# Patient Record
(demographics unavailable — no encounter records)

---

## 2025-04-15 NOTE — CONSULT LETTER
[FreeTextEntry2] : Guerrero Martinez MD [FreeTextEntry1] : Dear Guerrero,  Thanks for referring Mr. Tilley for evaluation of his left tympanic membrane perforation and recurring otorrhea.  We discussed the option of surgical repair, but I first plan to check a temporal bone CT to assess for mastoid inflammation that would affect surgical planning.  Thanks again for your referral.  Sincerely,  Marc Lynn MD Otology/Neurotology [facility]

## 2025-04-15 NOTE — ASSESSMENT
[FreeTextEntry1] : Exam shows large left tympanic membrane perforation with erosion of malleus handle.  Right tympanic membrane intact without effusion or retraction, tympanosclerosis posteriorly.  I reviewed an audiogram which shows a left conductive hearing loss.  I reviewed other outside records including outside ENT office notes recommending evaluation for repair of eardrum perforation.  Check temporal bone CT given history of otorrhea, follow-up after imaging for further discussion regarding surgical repair perforation.  Patient has history of allergies and chronic nasal congestion as well as headaches, nasal endoscopy shows no evidence of acute sinusitis.

## 2025-04-15 NOTE — REASON FOR VISIT
[Initial Evaluation] : an initial evaluation for [[employer] ] : provided by [employer]   [Source: ______] : History obtained from [unfilled] [FreeTextEntry2] : L TM perf and CHL [Interpreters_IDNumber] : [id number] [Interpreters_FullName] : Ericka  [TWNoteComboBox1] : Christina

## 2025-04-15 NOTE — HISTORY OF PRESENT ILLNESS
[de-identified] : 44 year old male presents with L TM perf and CHL He is referred by Dr. Guerrero Martinez (ENT&A)- audiogram preformed 01/06/25  States hearing is poor.  Also experiencing water sensation to bilateral ears and itchy ears.  Not using hearing devices  Hx of migraines  Patient denies otalgia, otorrhea, ear infections, bleeding, hearing loss, ear surgeries, tinnitus, dizziness, vertigo, headaches related to hearing.

## 2025-04-15 NOTE — PROCEDURE
[FreeTextEntry3] : Procedure note:  Binocular microscopy  Apr 15, 2025   Inspection of the ears was performed under binocular microscopy because of need to accurately visualize otologic landmarks and potential pathologic conditions that would not otherwise be visible through simple otoscopic exam. [de-identified] : Procedure note: Nasal endoscopy   Apr 15, 2025   Description of Procedure:  Nasal endoscopy was performed because of recalcitrant symptoms of nasal obstruction and/or chronic rhinitis, and anterior anatomic abnormalities precluding visualization.  Using a flexible endoscope with sheath, the nasal mucosa, septum, turbinates, and ostiomeatal complex were examined.    Nasal mucosa findings:  the nasal mucosa was edematous. Septum findings:  the septum showed no abnormalities, deviated to right Nasopharynx findings:  the nasopharynx was normal. Middle meatus findings:  the middle meatus had no abnormalities. Sinuses findings:  the paranasal sinuses had no abnormalities.

## 2025-05-20 NOTE — REASON FOR VISIT
[Subsequent Evaluation] : a subsequent evaluation for [[employer] ] : provided by [employer]   [FreeTextEntry2] : L TM perf and CHL [Interpreters_FullName] : Artemio [TWNoteComboBox1] : Christina

## 2025-05-20 NOTE — ASSESSMENT
[FreeTextEntry1] : Used [employer]  [id number] No new drainage since last visit.  Recent worsened nasal congestion and cough.  Exam shows stable L TM perforation which is dry, R TM intact without effusion or retraction, b/l facial function normal.  Nasal endoscopy shows thin secretions and PND changes in posterior oropharynx.  I reviewed a new audiogram which shows a left CHL.  I reviewed CT images which show opacification of b/l mastoid cavities.  Discussed options for management of tympanic membrane perforation, including observation, hearing aid use, earplug use when swimming or tympanoplasty surgery.  Discussed details of tympanoplasty surgery, expected recovery period, as well as risks at length.  Because of mastoid opacification on CT recommended concurrent mastoidectomy to reduce likelihood of drainage and improve aeration.    Discussion of tympanoplasty with mastoidectomy surgery risks, benefits, and alternatives:  Risks, benefits, and alternatives of tympanoplasty were discussed with the patient.  Risks would include but are not limited to bleeding, infection, persistent pain, persistent perforation, persistent drainage, failure to improve hearing, worsened hearing including risk of profound hearing loss, recurrent serous effusion, persistent dizziness, persistent tinnitus, facial nerve injury, or taste changes.  Benefits would include closure of the perforation, reduction or elimination of drainage, possible improvement in hearing, and prevention of the need to maintain water precautions.  Alternatives would include observation or hearing aid use.  The patient agrees to proceed with surgery.

## 2025-05-20 NOTE — HISTORY OF PRESENT ILLNESS
[de-identified] : 44 year old male presents for follow up for L TM perf and CHL. History of allergies, chronic nasal congestion and headaches.  Continues to report decreased hearing.  States feels water sensation to ears.  States both ears feel itchy--has been using qtips Patient denies otalgia, otorrhea, ear infections, tinnitus, dizziness, vertigo, headaches related to hearing.  CT Temporal Bones 4/22/25 IMPRESSION: Right: -Under-aerated mastoid air cells with complete opacification. Partial opacification of the right epitympanum. No bony/ossicular destruction. Findings could be related to infectious/inflammatory process. -Thickened right tympanic membrane. Left: -Under-aerated mastoid air cells with complete opacification. Partial opacification of the left epitympanum. No bony/ossicular destruction. Findings could be related to infectious/inflammatory process. -Large left tympanic membrane perforation.

## 2025-05-20 NOTE — CONSULT LETTER
[FreeTextEntry2] : Guerrero Martinez MD [FreeTextEntry1] : Dear Guerrero,  Mr. Fischer presents for followup for his left tympanic membrane perforation.  He recently underwent CT which shows significant mastoid opacification in the left ear, and therefore we plan to set him up for left tympanomastoidectomy for repair of his perforation in the coming months.    Thank you once again for the opportunity to participate in his care, and I will keep you informed as to his progress.  Best regards,  Marc Lynn MD Otology/Neurotology [facility]

## 2025-05-20 NOTE — PROCEDURE
[FreeTextEntry3] : Procedure note:  Binocular microscopy  May 20, 2025   Inspection of the ears was performed under binocular microscopy because of need to accurately visualize otologic landmarks and potential pathologic conditions that would not otherwise be visible through simple otoscopic exam. [de-identified] : Procedure note: Nasal endoscopy   May 20, 2025   Description of Procedure:  Nasal endoscopy was performed because of recalcitrant symptoms of nasal obstruction and/or chronic rhinitis, and anterior anatomic abnormalities precluding visualization.  Using a flexible endoscope with sheath, the nasal mucosa, septum, turbinates, and ostiomeatal complex were examined.    Nasal mucosa findings:  the nasal mucosa was edematous. Septum findings:  the septum showed no abnormalities. Nasopharynx findings:  the nasopharynx was normal. Middle meatus findings:  the middle meatus had no abnormalities. Sinuses findings:  the paranasal sinuses had no abnormalities.